# Patient Record
Sex: FEMALE | URBAN - NONMETROPOLITAN AREA
[De-identification: names, ages, dates, MRNs, and addresses within clinical notes are randomized per-mention and may not be internally consistent; named-entity substitution may affect disease eponyms.]

---
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
## 2018-05-23 ENCOUNTER — DOCTOR'S OFFICE (OUTPATIENT)
Dept: URBAN - NONMETROPOLITAN AREA CLINIC 1 | Facility: CLINIC | Age: 18
Setting detail: OPHTHALMOLOGY
End: 2018-05-23
Payer: COMMERCIAL

## 2018-05-23 DIAGNOSIS — H16.103: ICD-10-CM

## 2018-05-23 PROCEDURE — 92012 INTRM OPH EXAM EST PATIENT: CPT | Performed by: OPTOMETRIST

## 2018-05-23 ASSESSMENT — REFRACTION_MANIFEST
OS_VA3: 20/
OS_VA1: 20/
OD_VA1: 20/
OS_VA2: 20/
OU_VA: 20/
OS_VA1: 20/
OS_VA2: 20/
OD_VA2: 20/
OD_VA1: 20/
OD_VA3: 20/
OS_VA3: 20/
OD_VA3: 20/
OD_VA2: 20/
OU_VA: 20/

## 2018-05-23 ASSESSMENT — REFRACTION_OUTSIDERX
OD_SPHERE: -2.00
OS_VA2: 20/20
OD_VA3: 20/
OD_VA1: 20/20
OS_SPHERE: -2.00
OS_VA3: 20/
OS_VA1: 20/20
OD_VA2: 20/20
OU_VA: 20/20

## 2018-05-23 ASSESSMENT — REFRACTION_AUTOREFRACTION
OD_SPHERE: -2.25
OD_AXIS: 84
OS_CYLINDER: 0.00
OD_CYLINDER: -0.25
OS_SPHERE: -2.25
OS_AXIS: 180

## 2018-05-23 ASSESSMENT — REFRACTION_CURRENTRX
OS_SPHERE: -2.25
OD_OVR_VA: 20/
OD_OVR_VA: 20/
OD_AXIS: 180
OS_AXIS: 180
OS_VPRISM_DIRECTION: SV
OD_SPHERE: -2.25
OS_CYLINDER: 0.00
OS_OVR_VA: 20/
OD_CYLINDER: 0.00
OD_VPRISM_DIRECTION: SV
OS_OVR_VA: 20/
OD_OVR_VA: 20/
OS_OVR_VA: 20/

## 2018-05-23 ASSESSMENT — VISUAL ACUITY
OD_BCVA: 20/30
OS_BCVA: 20/25

## 2018-05-23 ASSESSMENT — SPHEQUIV_DERIVED
OD_SPHEQUIV: -2.375
OS_SPHEQUIV: -2.25

## 2018-05-23 ASSESSMENT — CONFRONTATIONAL VISUAL FIELD TEST (CVF)
OS_FINDINGS: FULL
OD_FINDINGS: FULL

## 2018-06-05 ENCOUNTER — RX ONLY (RX ONLY)
Age: 18
End: 2018-06-05

## 2018-06-05 ENCOUNTER — DOCTOR'S OFFICE (OUTPATIENT)
Dept: URBAN - NONMETROPOLITAN AREA CLINIC 1 | Facility: CLINIC | Age: 18
Setting detail: OPHTHALMOLOGY
End: 2018-06-05
Payer: COMMERCIAL

## 2018-06-05 DIAGNOSIS — H16.103: ICD-10-CM

## 2018-06-05 DIAGNOSIS — H04.123: ICD-10-CM

## 2018-06-05 PROCEDURE — 92012 INTRM OPH EXAM EST PATIENT: CPT | Performed by: OPTOMETRIST

## 2018-06-05 ASSESSMENT — REFRACTION_OUTSIDERX
OU_VA: 20/20
OD_VA1: 20/20
OD_SPHERE: -2.00
OS_VA2: 20/20
OD_VA2: 20/20
OS_VA1: 20/20
OD_VA3: 20/
OS_VA3: 20/
OS_SPHERE: -2.00

## 2018-06-05 ASSESSMENT — REFRACTION_MANIFEST
OD_VA2: 20/
OS_VA2: 20/
OU_VA: 20/
OS_VA1: 20/
OD_VA3: 20/
OS_VA3: 20/
OD_VA1: 20/
OS_VA1: 20/
OU_VA: 20/
OD_VA2: 20/
OS_VA2: 20/
OD_VA3: 20/
OS_VA3: 20/
OD_VA1: 20/

## 2018-06-05 ASSESSMENT — REFRACTION_CURRENTRX
OS_OVR_VA: 20/
OS_OVR_VA: 20/
OD_OVR_VA: 20/
OD_OVR_VA: 20/
OD_AXIS: 180
OS_AXIS: 180
OD_OVR_VA: 20/
OD_SPHERE: -2.25
OD_CYLINDER: 0.00
OS_OVR_VA: 20/
OS_SPHERE: -2.25
OD_VPRISM_DIRECTION: SV
OS_CYLINDER: 0.00
OS_VPRISM_DIRECTION: SV

## 2018-06-05 ASSESSMENT — REFRACTION_AUTOREFRACTION
OD_CYLINDER: -0.25
OS_SPHERE: -2.25
OD_SPHERE: -2.25
OS_CYLINDER: 0.00
OD_AXIS: 84
OS_AXIS: 180

## 2018-06-05 ASSESSMENT — CONFRONTATIONAL VISUAL FIELD TEST (CVF)
OS_FINDINGS: FULL
OD_FINDINGS: FULL

## 2018-06-05 ASSESSMENT — SPHEQUIV_DERIVED
OS_SPHEQUIV: -2.25
OD_SPHEQUIV: -2.375

## 2018-06-05 ASSESSMENT — VISUAL ACUITY
OS_BCVA: 20/25
OD_BCVA: 20/20-2

## 2018-11-19 ENCOUNTER — DOCTOR'S OFFICE (OUTPATIENT)
Dept: URBAN - NONMETROPOLITAN AREA CLINIC 1 | Facility: CLINIC | Age: 18
Setting detail: OPHTHALMOLOGY
End: 2018-11-19
Payer: COMMERCIAL

## 2018-11-19 ENCOUNTER — RX ONLY (RX ONLY)
Age: 18
End: 2018-11-19

## 2018-11-19 DIAGNOSIS — H52.13: ICD-10-CM

## 2018-11-19 PROBLEM — H16.103: Status: RESOLVED | Noted: 2018-05-23 | Resolved: 2018-11-19

## 2018-11-19 PROCEDURE — 92310 CONTACT LENS FITTING OU: CPT | Performed by: OPTOMETRIST

## 2018-11-19 PROCEDURE — 92015 DETERMINE REFRACTIVE STATE: CPT | Performed by: OPTOMETRIST

## 2018-11-19 ASSESSMENT — REFRACTION_MANIFEST
OS_VA1: 20/
OS_VA3: 20/
OS_SPHERE: -2.25
OS_CYLINDER: SPH
OS_VA2: 20/20
OD_SPHERE: -2.25
OD_VA1: 20/
OD_VA2: 20/
OS_VA3: 20/
OD_VA1: 20/20
OD_VA3: 20/
OU_VA: 20/
OD_CYLINDER: SPH
OS_VA2: 20/
OS_VA1: 20/20
OD_VA2: 20/20
OD_VA3: 20/
OU_VA: 20/20

## 2018-11-19 ASSESSMENT — VISUAL ACUITY
OS_BCVA: 20/25+2
OD_BCVA: 20/25-1

## 2018-11-19 ASSESSMENT — REFRACTION_CURRENTRX
OS_OVR_VA: 20/
OS_OVR_VA: 20/
OD_SPHERE: -2.00
OS_OVR_VA: 20/
OD_VPRISM_DIRECTION: SV
OS_VPRISM_DIRECTION: SV
OD_OVR_VA: 20/
OD_CYLINDER: SPH
OS_SPHERE: -2.00
OS_CYLINDER: SPH
OD_OVR_VA: 20/
OD_OVR_VA: 20/

## 2018-11-19 ASSESSMENT — CONFRONTATIONAL VISUAL FIELD TEST (CVF)
OD_FINDINGS: FULL
OS_FINDINGS: FULL

## 2018-11-19 ASSESSMENT — REFRACTION_AUTOREFRACTION
OD_SPHERE: -2.25
OD_CYLINDER: 0.00
OD_AXIS: 180
OS_AXIS: 180
OS_SPHERE: -2.50
OS_CYLINDER: 0.00

## 2018-11-19 ASSESSMENT — SPHEQUIV_DERIVED
OS_SPHEQUIV: -2.5
OD_SPHEQUIV: -2.25

## 2020-08-17 ENCOUNTER — DOCTOR'S OFFICE (OUTPATIENT)
Dept: URBAN - NONMETROPOLITAN AREA CLINIC 2 | Facility: CLINIC | Age: 20
Setting detail: OPHTHALMOLOGY
End: 2020-08-17
Payer: COMMERCIAL

## 2020-08-17 ENCOUNTER — OPTICAL OFFICE (OUTPATIENT)
Dept: URBAN - NONMETROPOLITAN AREA CLINIC 5 | Facility: CLINIC | Age: 20
Setting detail: OPHTHALMOLOGY
End: 2020-08-17
Payer: COMMERCIAL

## 2020-08-17 DIAGNOSIS — H52.13: ICD-10-CM

## 2020-08-17 PROCEDURE — S0500 DISPOS CONT LENS: HCPCS | Performed by: OPTOMETRIST

## 2020-08-17 PROCEDURE — 92310 CONTACT LENS FITTING OU: CPT | Performed by: OPTOMETRIST

## 2020-08-17 PROCEDURE — 92014 COMPRE OPH EXAM EST PT 1/>: CPT | Performed by: OPTOMETRIST

## 2020-08-17 ASSESSMENT — CONFRONTATIONAL VISUAL FIELD TEST (CVF)
OD_FINDINGS: FULL
OS_FINDINGS: FULL

## 2020-08-17 ASSESSMENT — VISUAL ACUITY
OS_BCVA: 20/25+2
OD_BCVA: 20/25-1

## 2020-08-17 ASSESSMENT — REFRACTION_CURRENTRX
OS_CYLINDER: SPH
OD_VPRISM_DIRECTION: SV
OD_SPHERE: -2.00
OD_CYLINDER: SPH
OD_OVR_VA: 20/
OS_VPRISM_DIRECTION: SV
OS_SPHERE: -2.00
OS_OVR_VA: 20/

## 2020-08-17 ASSESSMENT — REFRACTION_MANIFEST
OS_SPHERE: -2.25
OD_CYLINDER: SPH
OD_SPHERE: -2.25
OD_VA2: 20/20
OS_VA1: 20/20
OD_VA1: 20/20
OS_CYLINDER: SPH
OS_VA2: 20/20
OU_VA: 20/20

## 2020-08-17 ASSESSMENT — REFRACTION_AUTOREFRACTION
OD_CYLINDER: 0.00
OD_SPHERE: -2.25
OS_AXIS: 180
OS_SPHERE: -2.50
OD_AXIS: 180
OS_CYLINDER: 0.00

## 2020-08-17 ASSESSMENT — SPHEQUIV_DERIVED
OS_SPHEQUIV: -2.5
OD_SPHEQUIV: -2.25

## 2021-10-12 ENCOUNTER — OPTICAL OFFICE (OUTPATIENT)
Dept: URBAN - NONMETROPOLITAN AREA CLINIC 4 | Facility: CLINIC | Age: 21
Setting detail: OPHTHALMOLOGY
End: 2021-10-12
Payer: COMMERCIAL

## 2021-10-12 ENCOUNTER — DOCTOR'S OFFICE (OUTPATIENT)
Dept: URBAN - NONMETROPOLITAN AREA CLINIC 1 | Facility: CLINIC | Age: 21
Setting detail: OPHTHALMOLOGY
End: 2021-10-12
Payer: COMMERCIAL

## 2021-10-12 DIAGNOSIS — H52.13: ICD-10-CM

## 2021-10-12 PROCEDURE — 92014 COMPRE OPH EXAM EST PT 1/>: CPT | Performed by: OPTOMETRIST

## 2021-10-12 PROCEDURE — V2103 SPHEROCYLINDR 4.00D/12-2.00D: HCPCS | Performed by: OPTOMETRIST

## 2021-10-12 PROCEDURE — V2100 LENS SPHER SINGLE PLANO 4.00: HCPCS | Performed by: OPTOMETRIST

## 2021-10-12 PROCEDURE — V2025 EYEGLASSES DELUX FRAMES: HCPCS | Performed by: OPTOMETRIST

## 2021-10-12 PROCEDURE — V2784 LENS POLYCARB OR EQUAL: HCPCS | Performed by: OPTOMETRIST

## 2021-10-12 PROCEDURE — V2020 VISION SVCS FRAMES PURCHASES: HCPCS | Performed by: OPTOMETRIST

## 2021-10-12 PROCEDURE — 92310 CONTACT LENS FITTING OU: CPT | Performed by: OPTOMETRIST

## 2021-10-12 PROCEDURE — V2750 ANTI-REFLECTIVE COATING: HCPCS | Performed by: OPTOMETRIST

## 2021-10-12 PROCEDURE — V2799 MISC VISION ITEM OR SERVICE: HCPCS | Performed by: OPTOMETRIST

## 2021-10-12 ASSESSMENT — REFRACTION_MANIFEST
OS_VA1: 20/20
OD_VA2: 20/20
OS_CYLINDER: SPH
OS_VA2: 20/20
OD_VA1: 20/20
OD_SPHERE: -2.75
OU_VA: 20/20
OS_SPHERE: -3.00
OD_CYLINDER: -0.25
OD_AXIS: 135

## 2021-10-12 ASSESSMENT — REFRACTION_CURRENTRX
OS_VPRISM_DIRECTION: SV
OD_OVR_VA: 20/
OD_VPRISM_DIRECTION: SV
OS_OVR_VA: 20/
OD_CYLINDER: 0.00
OS_AXIS: 180
OS_CYLINDER: SPH
OD_SPHERE: -2.00
OS_SPHERE: -2.00
OD_AXIS: 180

## 2021-10-12 ASSESSMENT — VISUAL ACUITY
OS_BCVA: 20/30+1
OD_BCVA: 20/50-2

## 2021-10-12 ASSESSMENT — SPHEQUIV_DERIVED
OD_SPHEQUIV: -2.75
OS_SPHEQUIV: -3
OD_SPHEQUIV: -2.875

## 2021-10-12 ASSESSMENT — TONOMETRY
OD_IOP_MMHG: 15
OS_IOP_MMHG: 15

## 2021-10-12 ASSESSMENT — CONFRONTATIONAL VISUAL FIELD TEST (CVF)
OD_FINDINGS: FULL
OS_FINDINGS: FULL

## 2021-10-12 ASSESSMENT — REFRACTION_AUTOREFRACTION
OD_CYLINDER: -1.00
OS_AXIS: 000
OD_AXIS: 136
OD_SPHERE: -2.25
OS_SPHERE: -3.00
OS_CYLINDER: 0.00

## 2021-10-13 ENCOUNTER — OPTICAL OFFICE (OUTPATIENT)
Dept: URBAN - NONMETROPOLITAN AREA CLINIC 4 | Facility: CLINIC | Age: 21
Setting detail: OPHTHALMOLOGY
End: 2021-10-13

## 2021-10-13 DIAGNOSIS — H52.13: ICD-10-CM

## 2021-10-13 PROCEDURE — S0500 DISPOS CONT LENS: HCPCS | Performed by: OPTOMETRIST

## 2022-10-17 ENCOUNTER — DOCTOR'S OFFICE (OUTPATIENT)
Dept: URBAN - NONMETROPOLITAN AREA CLINIC 1 | Facility: CLINIC | Age: 22
Setting detail: OPHTHALMOLOGY
End: 2022-10-17
Payer: COMMERCIAL

## 2022-10-17 DIAGNOSIS — H52.13: ICD-10-CM

## 2022-10-17 PROBLEM — H04.123: Status: ACTIVE | Noted: 2018-06-05

## 2022-10-17 PROCEDURE — 92310 CONTACT LENS FITTING OU: CPT | Performed by: OPTOMETRIST

## 2022-10-17 PROCEDURE — 92014 COMPRE OPH EXAM EST PT 1/>: CPT | Performed by: OPTOMETRIST

## 2022-10-17 ASSESSMENT — REFRACTION_AUTOREFRACTION
OS_AXIS: 053
OS_CYLINDER: -0.25
OD_CYLINDER: -0.50
OD_AXIS: 045
OS_SPHERE: 0.00
OD_SPHERE: +0.25

## 2022-10-17 ASSESSMENT — TONOMETRY
OD_IOP_MMHG: 15
OS_IOP_MMHG: 15

## 2022-10-17 ASSESSMENT — REFRACTION_CURRENTRX
OD_SPHERE: -2.00
OD_VPRISM_DIRECTION: SV
OD_CYLINDER: 0.00
OS_AXIS: 180
OS_VPRISM_DIRECTION: SV
OD_SPHERE: -2.50
OS_SPHERE: -2.00
OD_OVR_VA: 20/
OS_OVR_VA: 20/
OS_SPHERE: -2.75
OS_CYLINDER: SPH
OD_AXIS: 142
OD_VPRISM_DIRECTION: SV
OD_OVR_VA: 20/
OS_CYLINDER: 0.00
OD_AXIS: 180
OS_OVR_VA: 20/
OD_CYLINDER: -0.25
OS_AXIS: 180
OS_VPRISM_DIRECTION: SV

## 2022-10-17 ASSESSMENT — REFRACTION_MANIFEST
OD_CYLINDER: -0.25
OU_VA: 20/20
OD_VA1: 20/20
OD_VA2: 20/20
OS_CYLINDER: SPH
OD_SPHERE: -2.75
OS_VA1: 20/20
OD_AXIS: 135
OS_SPHERE: -3.00
OS_VA2: 20/20

## 2022-10-17 ASSESSMENT — VISUAL ACUITY
OD_BCVA: 20/20-2
OS_BCVA: 20/20-2

## 2022-10-17 ASSESSMENT — SPHEQUIV_DERIVED
OS_SPHEQUIV: -0.125
OD_SPHEQUIV: 0
OD_SPHEQUIV: -2.875

## 2022-10-17 ASSESSMENT — CONFRONTATIONAL VISUAL FIELD TEST (CVF)
OD_FINDINGS: FULL
OS_FINDINGS: FULL

## 2023-06-28 ENCOUNTER — DOCTOR'S OFFICE (OUTPATIENT)
Dept: URBAN - NONMETROPOLITAN AREA CLINIC 1 | Facility: CLINIC | Age: 23
Setting detail: OPHTHALMOLOGY
End: 2023-06-28

## 2023-06-28 DIAGNOSIS — H52.13: ICD-10-CM

## 2023-06-28 PROBLEM — R51.9 HEADACHE, UNSPECIFIED ; BOTH EYES: Status: ACTIVE | Noted: 2023-06-28

## 2023-06-28 PROCEDURE — 92015 DETERMINE REFRACTIVE STATE: CPT | Performed by: OPTOMETRIST

## 2023-06-28 ASSESSMENT — REFRACTION_CURRENTRX
OS_OVR_VA: 20/
OD_OVR_VA: 20/
OD_CYLINDER: -0.25
OS_AXIS: 180
OD_AXIS: 142
OD_SPHERE: -2.75
OS_VPRISM_DIRECTION: SV
OD_CYLINDER: -0.25
OD_OVR_VA: 20/
OS_SPHERE: -2.75
OD_SPHERE: -2.50
OD_VPRISM_DIRECTION: SV
OS_CYLINDER: 0.00
OD_VPRISM_DIRECTION: SV
OS_CYLINDER: -0.25
OD_AXIS: 139
OS_SPHERE: -2.75
OS_AXIS: 023
OS_VPRISM_DIRECTION: SV
OS_OVR_VA: 20/

## 2023-06-28 ASSESSMENT — REFRACTION_AUTOREFRACTION
OS_CYLINDER: 0.00
OD_AXIS: 180
OS_AXIS: 180
OS_SPHERE: -0.50
OD_CYLINDER: 0.00
OD_SPHERE: 0.00

## 2023-06-28 ASSESSMENT — SPHEQUIV_DERIVED
OD_SPHEQUIV: 0
OD_SPHEQUIV: -2.875
OS_SPHEQUIV: -0.5

## 2023-06-28 ASSESSMENT — REFRACTION_MANIFEST
OU_VA: 20/20
OD_VA2: 20/20
OS_CYLINDER: SPH
OD_CYLINDER: -0.25
OD_SPHERE: -2.75
OS_SPHERE: -3.00
OS_VA1: 20/20
OS_VA2: 20/20
OD_AXIS: 135
OD_VA1: 20/20

## 2023-06-28 ASSESSMENT — CONFRONTATIONAL VISUAL FIELD TEST (CVF)
OS_FINDINGS: FULL
OD_FINDINGS: FULL

## 2023-06-28 ASSESSMENT — VISUAL ACUITY
OD_BCVA: 20/30-2
OS_BCVA: 20/25-1

## 2023-06-28 ASSESSMENT — SUPERFICIAL PUNCTATE KERATITIS (SPK)
OS_SPK: T 1+
OD_SPK: T 1+

## 2023-08-18 ENCOUNTER — DOCTOR'S OFFICE (OUTPATIENT)
Dept: URBAN - NONMETROPOLITAN AREA CLINIC 1 | Facility: CLINIC | Age: 23
Setting detail: OPHTHALMOLOGY
End: 2023-08-18
Payer: COMMERCIAL

## 2023-08-18 DIAGNOSIS — H04.123: ICD-10-CM

## 2023-08-18 DIAGNOSIS — R51.9: ICD-10-CM

## 2023-08-18 PROCEDURE — 92012 INTRM OPH EXAM EST PATIENT: CPT | Performed by: OPTOMETRIST

## 2023-08-18 ASSESSMENT — TONOMETRY
OS_IOP_MMHG: 15
OD_IOP_MMHG: 15

## 2023-08-18 ASSESSMENT — SUPERFICIAL PUNCTATE KERATITIS (SPK)
OD_SPK: T 1+
OS_SPK: T 1+

## 2023-08-18 ASSESSMENT — CONFRONTATIONAL VISUAL FIELD TEST (CVF)
OD_FINDINGS: FULL
OS_FINDINGS: FULL

## 2023-08-18 ASSESSMENT — VISUAL ACUITY
OS_BCVA: 20/20
OD_BCVA: 20/20

## 2023-11-20 ENCOUNTER — OPTICAL OFFICE (OUTPATIENT)
Dept: URBAN - NONMETROPOLITAN AREA CLINIC 4 | Facility: CLINIC | Age: 23
Setting detail: OPHTHALMOLOGY
End: 2023-11-20
Payer: COMMERCIAL

## 2023-11-20 DIAGNOSIS — H52.13: ICD-10-CM

## 2023-11-20 PROCEDURE — S0500 DISPOS CONT LENS: HCPCS | Mod: RT | Performed by: OPTOMETRIST

## 2023-11-20 ASSESSMENT — REFRACTION_CURRENTRX
OD_AXIS: 139
OD_CYLINDER: -0.25
OD_AXIS: 142
OS_VPRISM_DIRECTION: SV
OS_OVR_VA: 20/
OS_CYLINDER: -0.25
OD_VPRISM_DIRECTION: SV
OS_SPHERE: -2.75
OD_OVR_VA: 20/
OD_CYLINDER: -0.25
OS_SPHERE: -2.75
OS_OVR_VA: 20/
OD_SPHERE: -2.75
OS_AXIS: 023
OD_OVR_VA: 20/
OS_CYLINDER: 0.00
OD_SPHERE: -2.50
OS_VPRISM_DIRECTION: SV
OD_VPRISM_DIRECTION: SV
OS_AXIS: 180

## 2023-11-20 ASSESSMENT — SPHEQUIV_DERIVED
OS_SPHEQUIV: -2.75
OD_SPHEQUIV: -3

## 2023-11-20 ASSESSMENT — REFRACTION_MANIFEST
OD_SPHERE: -3.00
OD_VA1: 20/20
OS_VA1: 20/20
OS_CYLINDER: SPH
OD_VA2: 20/20
OU_VA: 20/20
OS_SPHERE: -3.00
OD_CYLINDER: SPH
OS_VA2: 20/20

## 2023-11-20 ASSESSMENT — REFRACTION_AUTOREFRACTION
OS_CYLINDER: 0.00
OS_SPHERE: -2.75
OD_CYLINDER: 0.00
OD_SPHERE: -3.00

## 2024-08-13 ENCOUNTER — DOCTOR'S OFFICE (OUTPATIENT)
Dept: URBAN - NONMETROPOLITAN AREA CLINIC 1 | Facility: CLINIC | Age: 24
Setting detail: OPHTHALMOLOGY
End: 2024-08-13
Payer: COMMERCIAL

## 2024-08-13 DIAGNOSIS — H52.13: ICD-10-CM

## 2024-08-13 PROCEDURE — 92014 COMPRE OPH EXAM EST PT 1/>: CPT | Performed by: OPTOMETRIST

## 2024-08-13 PROCEDURE — 92310 CONTACT LENS FITTING OU: CPT | Performed by: OPTOMETRIST

## 2024-08-13 ASSESSMENT — CONFRONTATIONAL VISUAL FIELD TEST (CVF)
OD_FINDINGS: FULL
OS_FINDINGS: FULL

## 2024-10-16 ENCOUNTER — TELEPHONE (OUTPATIENT)
Dept: ADMINISTRATIVE | Facility: OTHER | Age: 24
End: 2024-10-16

## 2024-10-16 NOTE — TELEPHONE ENCOUNTER
"Upon review of the In Basket request we have found/obtained the documentation. After careful review of the document we are unable to complete this request for Pap Smear (HPV) aka Cervical Cancer Screening because the documentation does not have the proper verbiage (wording) needed to close the requested care gap(s).    Result in care everywhere does not have the complete cervical cancer screening, it only states \"outside procedure.\"    Any additional questions or concerns should be emailed to the Practice Liaisons via the appropriate education email address, please do not reply via In Basket.    Thank you  Kendra Gama MA   [facility]          "

## 2024-10-16 NOTE — TELEPHONE ENCOUNTER
----- Message from Norma APPIAH sent at 10/16/2024  8:48 AM EDT -----  Regarding: Quality Update  10/16/24 8:48 AM    Mir, our patient Saturnino Olivares has had Pap Smear (HPV) aka Cervical Cancer Screening completed/performed. Please assist in updating the patient chart by pulling the Care Everywhere (CE) document. The date of service is 08/17/2023.     Thank you,  TALIA Tsai [facility]

## 2024-10-17 ENCOUNTER — APPOINTMENT (OUTPATIENT)
Age: 24
End: 2024-10-17
Payer: COMMERCIAL

## 2024-10-17 ENCOUNTER — OFFICE VISIT (OUTPATIENT)
Dept: FAMILY MEDICINE CLINIC | Facility: CLINIC | Age: 24
End: 2024-10-17

## 2024-10-17 VITALS
DIASTOLIC BLOOD PRESSURE: 64 MMHG | BODY MASS INDEX: 22.71 KG/M2 | SYSTOLIC BLOOD PRESSURE: 94 MMHG | WEIGHT: 133 LBS | HEIGHT: 64 IN | HEART RATE: 78 BPM | OXYGEN SATURATION: 99 %

## 2024-10-17 DIAGNOSIS — M25.561 ACUTE PAIN OF RIGHT KNEE: Primary | ICD-10-CM

## 2024-10-17 DIAGNOSIS — M40.04 POSTURAL KYPHOSIS OF THORACIC REGION: ICD-10-CM

## 2024-10-17 DIAGNOSIS — M25.561 ACUTE PAIN OF RIGHT KNEE: ICD-10-CM

## 2024-10-17 DIAGNOSIS — G43.009 MIGRAINE WITHOUT AURA AND WITHOUT STATUS MIGRAINOSUS, NOT INTRACTABLE: ICD-10-CM

## 2024-10-17 PROBLEM — A60.04 HERPES SIMPLEX VULVOVAGINITIS: Status: ACTIVE | Noted: 2021-12-06

## 2024-10-17 PROBLEM — Z82.49 FAMILY HISTORY OF BRAIN ANEURYSM: Status: ACTIVE | Noted: 2021-12-06

## 2024-10-17 PROCEDURE — NAPROXEN 500MG NAPROXEN 500MG: Performed by: STUDENT IN AN ORGANIZED HEALTH CARE EDUCATION/TRAINING PROGRAM

## 2024-10-17 PROCEDURE — 73564 X-RAY EXAM KNEE 4 OR MORE: CPT

## 2024-10-17 PROCEDURE — MEDROL 4MG MEDROL 4MG: Performed by: STUDENT IN AN ORGANIZED HEALTH CARE EDUCATION/TRAINING PROGRAM

## 2024-10-17 PROCEDURE — 99204 OFFICE O/P NEW MOD 45 MIN: CPT | Performed by: NURSE PRACTITIONER

## 2024-10-17 RX ORDER — NAPROXEN 500 MG/1
500 TABLET ORAL 2 TIMES DAILY WITH MEALS
Start: 2024-10-17

## 2024-10-17 RX ORDER — VALACYCLOVIR HYDROCHLORIDE 500 MG/1
500 TABLET, FILM COATED ORAL
COMMUNITY
Start: 2024-08-26

## 2024-10-17 RX ORDER — METHYLPREDNISOLONE 4 MG/1
TABLET ORAL
Start: 2024-10-17

## 2024-10-17 NOTE — ASSESSMENT & PLAN NOTE
She had these last year, but they have since resolved.     Orders:    Comprehensive metabolic panel; Future    CBC and differential; Future

## 2024-10-17 NOTE — ASSESSMENT & PLAN NOTE
Pain for 5 days following dancing. Pt reports stiff pain which is improving. She has been icing and elevated her knee. She is able to ambulate. She has not taken any OTC medication but has been icing and elevating her knee. At this time will place order for x-ray to r/o bony abnormality. Medrol dose pk and naproxen given in office. If no improvement will refer for PT. Ortho referral placed, to be used if no improvement or worsening of sx. Discussed SE and use of medications.   Orders:    XR knee 1 or 2 vw right; Future    methylPREDNISolone 4 MG tablet therapy pack; Use as directed on package    naproxen (EC NAPROSYN) 500 MG EC tablet; Take 1 tablet (500 mg total) by mouth 2 (two) times a day with meals    Ambulatory Referral to Orthopedic Surgery; Future

## 2024-10-17 NOTE — PATIENT INSTRUCTIONS
"Patient Education     Knee pain   The Basics   Written by the doctors and editors at [facility]   What causes knee pain? -- Many different conditions can cause knee pain. Some of the most common are listed below.   Bending or using the knee too much - This can cause pain in the front of the knee that worsens with running, climbing steps, or sitting for a long time.   Arthritis - Arthritis is a general term that means inflammation of the joints. There are lots of types of arthritis. The most common type, called osteoarthritis, often comes with age. It can cause pain, stiffness, and swelling (figure 1).   Bursitis - Bursitis happens when fluid-filled sacs around the knee (called \"bursae\") get irritated or swollen (figure 2). Bursitis can cause pain and swelling.   A collection of fluid in the knee - This can happen after a knee injury.   A tear in the meniscus - The meniscus is a cushion of rubbery material (cartilage) between the thigh bone and the leg bone (figure 3).   A tear in a ligament - Ligaments are bands of tissue that connect 1 bone to another. There are 4 ligaments in each knee (figure 3).   Muscle strain - Different leg muscles move the knee joint, causing the knee to bend and straighten. If 1 of these muscles or its tendon doesn't work well, moving the knee can cause pain. (Tendons are bands of tissue the connect muscles to bones.)   Other knee injuries, a knee joint infection, or a condition called gout, which causes crystals to form inside joints   Conditions that don't involve the knee - For example, problems in the hip can sometimes cause knee pain.  Is there anything I can do on my own to feel better? -- Yes. To ease your symptoms, you can:   Put ice on the knee to reduce pain and swelling - For the first few weeks after an injury, or after an activity that makes your pain worse, you can try icing your knee. Put a cold gel pack, bag of ice, or bag of frozen vegetables on the injured area every 1 to 2 " "hours, for 15 minutes each time. Put a thin towel between the ice (or other cold object) and your skin. To reduce swelling, sit or lie down and raise your leg above the level of your heart when you put ice on it.   Rest your knee and avoid movements that worsen the pain - Try not to squat, kneel, or run. Also, don't use exercise machines, such as stair steppers or rowing machines. Instead, you can walk or swim (the front and back crawl strokes) for exercise.   Take a pain-relieving medicine, such as acetaminophen (sample brand name: Tylenol) or ibuprofen (sample brand names: Advil, Motrin).  Should I see a doctor or nurse? -- See your doctor or nurse if:   You are unable to put weight on your knee, your knee \"locks\" in place, or your knee \"gives out\"   Your knee is very swollen and painful   You have a fever with knee pain, swelling, and redness   Your knee pain doesn't get better or gets worse after you treat it on your own for a few days  How is knee pain treated? -- The right treatment for knee pain depends on what is causing it. Treatments might include:   Wearing a knee brace or shoe insert   Doing exercises to strengthen and stretch the muscles that move the knee joint - Ask your doctor or nurse which exercises can help with the cause of your pain.   Having physical therapy   Losing weight, if needed - Talk to your doctor or nurse about whether losing weight might help your knee pain. They can help you lose weight in a healthy way.   Getting a shot of medicine in the knee   Other medicines   Surgery  All topics are updated as new evidence becomes available and our peer review process is complete.  This topic retrieved from [employer] on: Apr 25, 2024.  Topic 30955 Version 15.0  Release: 32.4.2 - C32.114  © 2024 [employer] and/or its affiliates. All rights reserved.  figure 1: Knee osteoarthritis     This drawing shows a normal knee joint next to a knee joint with osteoarthritis. In the osteoarthritis joint, " "the cartilage covering the ends of the bones roughens and becomes thin, while the bone underneath the cartilage grows thicker. Bony growths called \"osteophytes\" can form. The space between the bones also becomes narrower.  Graphic 028676 Version 3.0  figure 2: Knee bursa (prepatellar bursa)     Graphic 54257 Version 3.0  figure 3: Front view of the knee     This drawing shows the inner parts of the knee as seen from the front. A small bone (called the patella or the \"knee cap\") that sits in front of the knee has been removed so that you can see what is under that bone. The anterior cruciate ligament (ACL) is in the middle in white. It connects the thigh bone (called the \"femur\") to the shin bone (called the \"tibia\"). The meniscus is a cushion of rubbery material (cartilage) between the thigh bone and the shin bone.  Graphic 49615 Version 5.0  Consumer Information Use and Disclaimer   Disclaimer: This generalized information is a limited summary of diagnosis, treatment, and/or medication information. It is not meant to be comprehensive and should be used as a tool to help the user understand and/or assess potential diagnostic and treatment options. It does NOT include all information about conditions, treatments, medications, side effects, or risks that may apply to a specific patient. It is not intended to be medical advice or a substitute for the medical advice, diagnosis, or treatment of a health care provider based on the health care provider's examination and assessment of a patient's specific and unique circumstances. Patients must speak with a health care provider for complete information about their health, medical questions, and treatment options, including any risks or benefits regarding use of medications. This information does not endorse any treatments or medications as safe, effective, or approved for treating a specific patient. [employer] Inc[employer] and its affiliates disclaim any warranty or liability " relating to this information or the use thereof.The use of this information is governed by the Terms of Use, available at https://www.wolterskluwer.com/en/know/clinical-effectiveness-terms. 2024© Up[employer]Date, Inc. and its affiliates and/or licensors. All rights reserved.  Copyright   © 2024 [employer] and/or its affiliates. All rights reserved.

## 2024-10-17 NOTE — PROGRESS NOTES
Ambulatory Visit  Name: Saturnino Olivares      : 2000      MRN: [id number]  Encounter Provider: PERLA Seaman  Encounter Date: 10/17/2024   Encounter department: [facility]    Assessment & Plan  Acute pain of right knee  Pain for 5 days following dancing. Pt reports stiff pain which is improving. She has been icing and elevated her knee. She is able to ambulate. She has not taken any OTC medication but has been icing and elevating her knee. At this time will place order for x-ray to r/o bony abnormality. Medrol dose pk and naproxen given in office. If no improvement will refer for PT. Ortho referral placed, to be used if no improvement or worsening of sx. Discussed SE and use of medications.   Orders:    XR knee 1 or 2 vw right; Future    methylPREDNISolone 4 MG tablet therapy pack; Use as directed on package    naproxen (EC NAPROSYN) 500 MG EC tablet; Take 1 tablet (500 mg total) by mouth 2 (two) times a day with meals    Ambulatory Referral to Orthopedic Surgery; Future    Migraine without aura and without status migrainosus, not intractable  She had these last year, but they have since resolved.     Orders:    Comprehensive metabolic panel; Future    CBC and differential; Future    Postural kyphosis of thoracic region  She does workout and PT which does relieve.     Orders:    Comprehensive metabolic panel; Future    CBC and differential; Future      Depression Screening and Follow-up Plan: Patient was screened for depression during today's encounter. They screened negative with a PHQ-2 score of 0.      History of Present Illness     Pt here today to establish care and with concerns of right knee pain. Pt reports pain started on Saturday (5 days ago), after dancing at a wedding. She reports her knee is stiff, and worse when she bends her knee tightly. She is able to ambulate without pain. She reports pain is improving. She  "has been using ice for pain and keeping leg elevated. She denies direct injury to the knee.     Knee Pain   The incident occurred 3 to 5 days ago. Incident location: Wedding. There was no injury mechanism. The pain is present in the right knee. Quality: stiff. The pain is at a severity of 4/10. The pain is moderate. The pain has been Improving since onset. Pertinent negatives include no inability to bear weight, loss of motion, loss of sensation, muscle weakness, numbness or tingling. The symptoms are aggravated by movement. She has tried ice and elevation for the symptoms. The treatment provided mild relief.       History obtained from : patient  Review of Systems   Constitutional:  Negative for chills and fever.   HENT:  Negative for ear pain and sore throat.    Eyes:  Negative for pain and visual disturbance.   Respiratory:  Negative for cough and shortness of breath.    Cardiovascular:  Negative for chest pain and palpitations.   Gastrointestinal:  Negative for abdominal pain and vomiting.   Genitourinary:  Negative for dysuria and hematuria.   Musculoskeletal:  Negative for arthralgias and back pain.        Right knee pain/stiffness.    Skin:  Negative for color change and rash.   Neurological:  Negative for tingling, seizures, syncope and numbness.   All other systems reviewed and are negative.          Objective     BP 94/64 (BP Location: Right arm, Patient Position: Sitting, Cuff Size: Standard)   Pulse 78   Ht 5' 4\" (1.626 m)   Wt 60.3 kg (133 lb)   SpO2 99%   BMI 22.83 kg/m²     Physical Exam  Vitals and nursing note reviewed.   Constitutional:       General: She is not in acute distress.     Appearance: She is well-developed.   HENT:      Head: Normocephalic and atraumatic.      Right Ear: Tympanic membrane, ear canal and external ear normal.      Left Ear: Tympanic membrane, ear canal and external ear normal.      Nose: Nose normal.      Mouth/Throat:      Mouth: Mucous membranes are moist.      " Pharynx: Oropharynx is clear.   Eyes:      General:         Right eye: No discharge.         Left eye: No discharge.      Conjunctiva/sclera: Conjunctivae normal.      Pupils: Pupils are equal, round, and reactive to light.   Cardiovascular:      Rate and Rhythm: Normal rate and regular rhythm.      Heart sounds: No murmur heard.  Pulmonary:      Effort: Pulmonary effort is normal. No respiratory distress.      Breath sounds: Normal breath sounds.   Abdominal:      General: Bowel sounds are normal.      Palpations: Abdomen is soft.      Tenderness: There is no abdominal tenderness.   Musculoskeletal:         General: No swelling.      Cervical back: Neck supple.      Right knee: No swelling, deformity, effusion, erythema, ecchymosis, lacerations, bony tenderness or crepitus. Normal range of motion. No tenderness. No LCL laxity, MCL laxity, ACL laxity or PCL laxity. Normal alignment, normal meniscus and normal patellar mobility. Normal pulse.      Instability Tests: Anterior drawer test negative. Medial Isela test negative.      Left knee: Normal.      Right lower leg: Normal. No edema.      Left lower leg: Normal. No edema.   Skin:     General: Skin is warm and dry.      Capillary Refill: Capillary refill takes less than 2 seconds.   Neurological:      Mental Status: She is alert and oriented to person, place, and time.   Psychiatric:         Mood and Affect: Mood normal.         Behavior: Behavior normal.         Thought Content: Thought content normal.         Judgment: Judgment normal.       Administrative Statements   I have spent a total time of 40 minutes in caring for this patient on the day of the visit/encounter including Instructions for management, Impressions, Counseling / Coordination of care, Documenting in the medical record, Reviewing / ordering tests, medicine, procedures  , and Obtaining or reviewing history  .

## 2024-10-17 NOTE — ASSESSMENT & PLAN NOTE
She does workout and PT which does relieve.     Orders:    Comprehensive metabolic panel; Future    CBC and differential; Future

## 2024-10-18 ENCOUNTER — TELEPHONE (OUTPATIENT)
Dept: ADMINISTRATIVE | Facility: OTHER | Age: 24
End: 2024-10-18

## 2024-10-18 NOTE — TELEPHONE ENCOUNTER
Upon review of the request/inquiry, we are reaching out to you to ask for assistance. We have reviewed all Chart Review tabs, Care Everywhere (CE), completed a chart search and were unable to locate requested item(s). If you feel this was an oversight, please respond with with location and date of service of the document(s).    To respond with requested information, open this Encounter, navigate to the Routing section, add your response to the routing comments, add my name to the Recipient field, and select Send and Close Workspace.    Thank you  JESUS LOUIE MA

## 2024-10-18 NOTE — TELEPHONE ENCOUNTER
----- Message from PERLA Fonseca sent at 10/17/2024  4:15 PM EDT -----  Regarding: Care Gap Outreach  10/17/24 4:15 PM    Mir, our patient Saturnino Olivares has had Pap Smear (HPV) aka Cervical Cancer Screening completed/performed. Please assist in updating the patient chart by making an External outreach to [facility] facility located in [facility]. The date of service is 2024.    Thank you,  Niki ALVARENGA [facility]

## 2024-11-18 DIAGNOSIS — F43.9 STRESS: Primary | ICD-10-CM

## 2024-11-19 ENCOUNTER — TELEPHONE (OUTPATIENT)
Dept: FAMILY MEDICINE CLINIC | Facility: CLINIC | Age: 24
End: 2024-11-19

## 2025-02-25 ENCOUNTER — TELEPHONE (OUTPATIENT)
Age: 25
End: 2025-02-25

## 2025-02-25 NOTE — TELEPHONE ENCOUNTER
Contacted patient off of [facility]  wait list to verify needs of services in attempts to update list with patient preferences. spoke with patient whom stated they are no longer interested in services

## 2025-03-18 ENCOUNTER — TELEPHONE (OUTPATIENT)
Dept: FAMILY MEDICINE CLINIC | Facility: CLINIC | Age: 25
End: 2025-03-18

## 2025-03-18 NOTE — TELEPHONE ENCOUNTER
Attempted to call patient to reschedule physical with jake scheduled on 07/07/25 .I left a voice mail asking to call back to reschedule.    MADELINE [place]

## 2025-03-24 ENCOUNTER — TELEPHONE (OUTPATIENT)
Dept: FAMILY MEDICINE CLINIC | Facility: CLINIC | Age: 25
End: 2025-03-24